# Patient Record
Sex: FEMALE | Race: BLACK OR AFRICAN AMERICAN | Employment: UNEMPLOYED | URBAN - METROPOLITAN AREA
[De-identification: names, ages, dates, MRNs, and addresses within clinical notes are randomized per-mention and may not be internally consistent; named-entity substitution may affect disease eponyms.]

---

## 2019-11-21 LAB
ANTIBODY SCREEN, EXTERNAL: POSITIVE
CHLAMYDIA, EXTERNAL: NEGATIVE
GRBS, EXTERNAL: NEGATIVE
HBSAG, EXTERNAL: NEGATIVE
HCT, EXTERNAL: 37.5
HGB EVAL, EXTERNAL: NEGATIVE
HGB, EXTERNAL: 12.6
HIV, EXTERNAL: NON REACTIVE
N. GONORRHEA, EXTERNAL: NEGATIVE
PLATELET CNT,   EXTERNAL: 194
RPR, EXTERNAL: NON REACTIVE
RUBELLA, EXTERNAL: NORMAL
TYPE, ABO & RH, EXTERNAL: NORMAL
URINALYSIS, EXTERNAL: NO GROWTH

## 2020-01-24 ENCOUNTER — ROUTINE PRENATAL (OUTPATIENT)
Dept: OBGYN CLINIC | Age: 35
End: 2020-01-24

## 2020-01-24 VITALS
HEART RATE: 106 BPM | TEMPERATURE: 97.6 F | HEIGHT: 65 IN | OXYGEN SATURATION: 99 % | RESPIRATION RATE: 20 BRPM | BODY MASS INDEX: 28.86 KG/M2 | WEIGHT: 173.2 LBS | SYSTOLIC BLOOD PRESSURE: 112 MMHG | DIASTOLIC BLOOD PRESSURE: 72 MMHG

## 2020-01-24 DIAGNOSIS — Z67.91 RH NEGATIVE STATE IN ANTEPARTUM PERIOD: ICD-10-CM

## 2020-01-24 DIAGNOSIS — O26.899 RH NEGATIVE STATE IN ANTEPARTUM PERIOD: ICD-10-CM

## 2020-01-24 DIAGNOSIS — O44.40 LOW-LYING PLACENTA: ICD-10-CM

## 2020-01-24 NOTE — PROGRESS NOTES
Jostin Renee presents today for   Chief Complaint   Patient presents with    Routine Prenatal Visit     ILDA       Is someone accompanying this pt? no  atient does not have any concerns at this time. Patient no birth control at this time . Depression screening     Is the patient using any DME equipment during OV?  no    Depression Screening:  No flowsheet data found. Learning Assessment:  No flowsheet data found. Abuse Screening:  No flowsheet data found. Fall Risk  No flowsheet data found. This Visit Test  Results for orders placed or performed during the hospital encounter of 10/14/19   WET PREP   Result Value Ref Range    Wet prep        No Yeast or motile Trichomonas seen  No Clue Cells Seen     CT/GC DNA   Result Value Ref Range    CHLAMYDIA TRACHOMATIS DNA NOT DETECTED NOT DETECTED      NEISSERIA GONORRHOEAE DNA NOT DETECTED NOT DETECTED     BETA HCG, QT   Result Value Ref Range    HCG, beta, QT 43,611 mIU/ml   POC URINE MACROSCOPIC   Result Value Ref Range    Glucose Negative NEGATIVE,Negative mg/dl    Bilirubin Small (A) NEGATIVE,Negative      Ketone 15 (A) NEGATIVE,Negative mg/dl    Specific gravity >=1.030 1.005 - 1.030      Blood Negative NEGATIVE,Negative      pH (UA) 6.0 5 - 9      Protein 30 (A) NEGATIVE,Negative mg/dl    Urobilinogen 2.0 (H) 0.0 - 1.0 EU/dl    Nitrites Negative NEGATIVE,Negative      Leukocyte Esterase Negative NEGATIVE,Negative      Color Yellow      Appearance Clear     POC HCG,URINE   Result Value Ref Range    HCG urine, QL positive (A) NEGATIVE,Negative,negative     BLOOD TYPE, (ABO+RH)   Result Value Ref Range    ABO/Rh(D) B Rh Negative     ANTIBODY SCREEN   Result Value Ref Range    Antibody screen NEG         Health Maintenance reviewed and discussed and ordered per Provider. Health Maintenance Due   Topic Date Due    PAP AKA CERVICAL CYTOLOGY  12/21/2007    Influenza Age 5 to Adult  08/01/2019   . Coordination of Care:  1.  Have you been to the ER, urgent care clinic since your last visit? Hospitalized since your last visit? no    2. Have you seen or consulted any other health care providers outside of the [address] [place] [address] since your last visit? Include any pap smears or colon screening.  No  Visit Vitals  /72   Pulse (!) 106   Temp 97.6 °F (36.4 °C) (Oral)   Resp 20   Ht 5' 5\" (1.651 m)   Wt 173 lb 3.2 oz (78.6 kg)   SpO2 99%   BMI 28.82 kg/m²

## 2020-01-24 NOTE — PROGRESS NOTES
26.4WGA - doing well; transfer of care from [facility]. Only pregnancy complications are RH negative status for which she will need Rhogam at 28 weeks and low lying placenta which was suppose to be looked at again at morphology ultrasound which we still have to get done.   Plan:  1) RH negative: Rhogam at 28 weeks  2) Morphology ultrasound: scheduled for Monday 1/27 @ 9 am  3) F/U 2 weeks routine ob and rhogam, glucola, tdap

## 2020-02-13 ENCOUNTER — HOSPITAL ENCOUNTER (OUTPATIENT)
Dept: LAB | Age: 35
Discharge: HOME | End: 2020-02-13

## 2020-02-13 ENCOUNTER — ROUTINE PRENATAL (OUTPATIENT)
Dept: OBGYN CLINIC | Age: 35
End: 2020-02-13

## 2020-02-13 VITALS
DIASTOLIC BLOOD PRESSURE: 64 MMHG | WEIGHT: 174.8 LBS | OXYGEN SATURATION: 99 % | HEART RATE: 85 BPM | RESPIRATION RATE: 20 BRPM | SYSTOLIC BLOOD PRESSURE: 108 MMHG | BODY MASS INDEX: 29.12 KG/M2 | HEIGHT: 65 IN

## 2020-02-13 DIAGNOSIS — Z67.91 RH NEGATIVE STATE IN ANTEPARTUM PERIOD: Primary | ICD-10-CM

## 2020-02-13 DIAGNOSIS — O44.20 PARTIAL PLACENTA PREVIA: ICD-10-CM

## 2020-02-13 DIAGNOSIS — Z3A.29 29 WEEKS GESTATION OF PREGNANCY: ICD-10-CM

## 2020-02-13 DIAGNOSIS — O26.899 RH NEGATIVE STATE IN ANTEPARTUM PERIOD: Primary | ICD-10-CM

## 2020-02-13 LAB
GTT, 1 HR, GLUCOLA, EXTERNAL: 155
GTT, 1 HR, GLUCOLA, EXTERNAL: 155
SENTARA SPECIMEN COL,SENBCF: NORMAL

## 2020-02-13 PROCEDURE — 99001 SPECIMEN HANDLING PT-LAB: CPT

## 2020-02-13 NOTE — PROGRESS NOTES
29.3: +FM  Plan:  1) Rhogam  2) TDAP  3) Gluolca/CBC  4) partial placenta Previa: needs mfm referral for reevaluation

## 2020-02-13 NOTE — PROGRESS NOTES
After obtaining consent, and per orders of Dr. Precious Willoughby, injection of TDAP given by Shelli Brooks LPN. Patient instructed to remain in clinic for 10 minutes afterwards, and to report any adverse reaction to me immediately.

## 2020-02-13 NOTE — PROGRESS NOTES
Philomena Ramirez presents today for   Chief Complaint   Patient presents with    Routine Prenatal Visit       Is someone accompanying this pt? no  atient does not have any concerns at this time. Patient no birth control at this time . Depression screening     Is the patient using any DME equipment during OV? no    Depression Screening:  No flowsheet data found. Learning Assessment:  No flowsheet data found. Abuse Screening:  No flowsheet data found. Fall Risk  No flowsheet data found. This Visit Test  Results for orders placed or performed during the hospital encounter of 10/14/19   WET PREP   Result Value Ref Range    Wet prep        No Yeast or motile Trichomonas seen  No Clue Cells Seen     CT/GC DNA   Result Value Ref Range    CHLAMYDIA TRACHOMATIS DNA NOT DETECTED NOT DETECTED      NEISSERIA GONORRHOEAE DNA NOT DETECTED NOT DETECTED     BETA HCG, QT   Result Value Ref Range    HCG, beta, QT 43,611 mIU/ml   POC URINE MACROSCOPIC   Result Value Ref Range    Glucose Negative NEGATIVE,Negative mg/dl    Bilirubin Small (A) NEGATIVE,Negative      Ketone 15 (A) NEGATIVE,Negative mg/dl    Specific gravity >=1.030 1.005 - 1.030      Blood Negative NEGATIVE,Negative      pH (UA) 6.0 5 - 9      Protein 30 (A) NEGATIVE,Negative mg/dl    Urobilinogen 2.0 (H) 0.0 - 1.0 EU/dl    Nitrites Negative NEGATIVE,Negative      Leukocyte Esterase Negative NEGATIVE,Negative      Color Yellow      Appearance Clear     POC HCG,URINE   Result Value Ref Range    HCG urine, QL positive (A) NEGATIVE,Negative,negative     BLOOD TYPE, (ABO+RH)   Result Value Ref Range    ABO/Rh(D) B Rh Negative     ANTIBODY SCREEN   Result Value Ref Range    Antibody screen NEG         Health Maintenance reviewed and discussed and ordered per Provider. Health Maintenance Due   Topic Date Due    OB 3RD TRIMESTER TDAP  01/27/2020   . Coordination of Care:  1. Have you been to the ER, urgent care clinic since your last visit?  Hospitalized since your last visit? no    2. Have you seen or consulted any other health care providers outside of the [address] [place] [address] since your last visit? Include any pap smears or colon screening.  No  Visit Vitals  /64   Pulse 85   Resp 20   Ht 5' 5\" (1.651 m)   Wt 174 lb 12.8 oz (79.3 kg)   LMP 07/22/2019 (Exact Date)   SpO2 99%   BMI 29.09 kg/m²

## 2020-02-13 NOTE — PATIENT INSTRUCTIONS

## 2020-02-13 NOTE — PROGRESS NOTES
Per  order Patient received Rhogam injection in Right Gluteus without complications. Patient observed for 10 minutes post injection.   [id number]  6/29/2020

## 2020-02-14 LAB
ABSOLUTE LYMPHOCYTE COUNT, 10803: 1.9 K/UL (ref 1–4.8)
BASOPHILS # BLD: 0 K/UL (ref 0–0.2)
BASOPHILS NFR BLD: 1 % (ref 0–2)
EOSINOPHIL # BLD: 0 K/UL (ref 0–0.5)
EOSINOPHIL NFR BLD: 1 % (ref 0–6)
ERYTHROCYTE [DISTWIDTH] IN BLOOD BY AUTOMATED COUNT: 13.2 % (ref 10–15.5)
GESTATIONAL DIABETES SCREEN, 102285: 155 MG/DL (ref 65–139)
GRANULOCYTES,GRANS: 58 % (ref 40–75)
HCT VFR BLD AUTO: 37.8 % (ref 35.1–46.5)
HGB BLD-MCNC: 12.3 G/DL (ref 11.7–15.5)
IMMATURE PLATELET FRACTION: 10.1 % (ref 1.1–7.1)
LYMPHOCYTES, LYMLT: 33 % (ref 20–45)
MCH RBC QN AUTO: 28 PG (ref 26–34)
MCHC RBC AUTO-ENTMCNC: 33 G/DL (ref 31–36)
MCV RBC AUTO: 86 FL (ref 81–99)
MONOCYTES # BLD: 0.4 K/UL (ref 0.1–1)
MONOCYTES NFR BLD: 8 % (ref 3–12)
NEUTROPHILS # BLD AUTO: 3.3 K/UL (ref 1.8–7.7)
PLATELET # BLD AUTO: 143 K/UL (ref 140–440)
PMV BLD AUTO: 11.6 FL (ref 9–13)
RBC # BLD AUTO: 4.4 M/UL (ref 3.8–5.2)
WBC # BLD AUTO: 5.6 K/UL (ref 4–11)

## 2020-02-21 ENCOUNTER — DOCUMENTATION ONLY (OUTPATIENT)
Dept: OBGYN CLINIC | Age: 35
End: 2020-02-21

## 2020-02-21 NOTE — PROGRESS NOTES
I called patient this morning and left her a voicemail in regards to her needing to come in this upcoming Monday morning at 8:30 am in a fasting state for her three hour glucola.

## 2020-02-21 NOTE — PROGRESS NOTES
Patient made aware of abnormal results and given instructions on 3 day diet for 3hr GTT  patient verbalized understanding and stated she would do diet sat,sun,mon and come to office on Tuesday.

## 2020-02-24 ENCOUNTER — OFFICE VISIT (OUTPATIENT)
Dept: OBGYN CLINIC | Age: 35
End: 2020-02-24

## 2020-02-24 DIAGNOSIS — R73.09 ABNORMAL GLUCOSE: Primary | ICD-10-CM

## 2020-02-24 LAB
GTT 120 MIN, EXTERNAL: 151
GTT 180 MIN, EXTERNAL: 124
GTT 60 MIN, EXTERNAL: 164
GTT, FASTING, EXTERNAL: 90

## 2020-02-25 LAB
GLUCOSE, 2 HR,GTT2: 151 MG/DL
GLUCOSE, 3 HOUR, 011197: 124 MG/DL
GTT 1 HR, 6854: 164 MG/DL
GTT FASTING,90352: 90 MG/DL (ref 70–99)

## 2020-02-28 ENCOUNTER — ROUTINE PRENATAL (OUTPATIENT)
Dept: OBGYN CLINIC | Age: 35
End: 2020-02-28

## 2020-02-28 VITALS
HEIGHT: 65 IN | DIASTOLIC BLOOD PRESSURE: 72 MMHG | HEART RATE: 62 BPM | BODY MASS INDEX: 29.16 KG/M2 | RESPIRATION RATE: 20 BRPM | SYSTOLIC BLOOD PRESSURE: 115 MMHG | WEIGHT: 175 LBS

## 2020-02-28 DIAGNOSIS — Z3A.31 31 WEEKS GESTATION OF PREGNANCY: Primary | ICD-10-CM

## 2020-02-28 NOTE — PROGRESS NOTES
Ms. Narinder Jansen is a   31w4d with Estimated Date of Delivery: 20 who presents to the office for a routine prenatal visit. She denies contractions, leakage of fluid, or vaginal bleeding. She reports normal fetal movement. Visit Vitals  /72   Pulse 62   Resp 20   Ht 5' 5\" (1.651 m)   Wt 175 lb (79.4 kg)   LMP 2019 (Exact Date)   BMI 29.12 kg/m²       A/P  36 y.o. with an IUP at 31w4d here for a routine prenatal visit. 1. Continue routine prenatal care  2. Strict 3rd trimester precautions given. Advised regarding leakage of fluid, contractions, and vaginal bleeding. Fetal kick count instructions given. 3. F/U in 2 weeks  4.  Repeat U/S with [facility] on this upcoming Tuesday to re-evaluate placental location

## 2020-03-06 LAB
BILIRUB UR QL STRIP: NEGATIVE
GLUCOSE UR-MCNC: NEGATIVE MG/DL
KETONES P FAST UR STRIP-MCNC: NEGATIVE MG/DL
PH UR STRIP: 7 [PH] (ref 4.6–8)
PROT UR QL STRIP: NEGATIVE
SP GR UR STRIP: 1.01 (ref 1–1.03)
UA UROBILINOGEN AMB POC: NORMAL (ref 0.2–1)
URINALYSIS CLARITY POC: CLEAR
URINALYSIS COLOR POC: YELLOW
URINE BLOOD POC: NEGATIVE
URINE LEUKOCYTES POC: NORMAL
URINE NITRITES POC: NEGATIVE

## 2020-03-16 ENCOUNTER — ROUTINE PRENATAL (OUTPATIENT)
Dept: OBGYN CLINIC | Age: 35
End: 2020-03-16

## 2020-03-16 VITALS
DIASTOLIC BLOOD PRESSURE: 73 MMHG | TEMPERATURE: 98.5 F | HEIGHT: 65 IN | HEART RATE: 109 BPM | BODY MASS INDEX: 28.82 KG/M2 | WEIGHT: 173 LBS | SYSTOLIC BLOOD PRESSURE: 119 MMHG | RESPIRATION RATE: 18 BRPM

## 2020-03-16 DIAGNOSIS — Z3A.34 PREGNANCY WITH 34 COMPLETED WEEKS GESTATION: Primary | ICD-10-CM

## 2020-03-16 PROBLEM — O41.8X30 SUBCHORIONIC HEMORRHAGE OF PLACENTA IN THIRD TRIMESTER: Status: ACTIVE | Noted: 2020-03-16

## 2020-03-16 PROBLEM — O46.8X3 SUBCHORIONIC HEMORRHAGE OF PLACENTA IN THIRD TRIMESTER: Status: ACTIVE | Noted: 2020-03-16

## 2020-03-16 NOTE — PROGRESS NOTES
34w0d. No Ob issues  Denies LOF/VB  Has received Tdap and Rhogam.  Placenta previa resolved, but a marginal subchorionic hemorrhage noted, which will be re-assessed on 3/30. RTO 2 weeks. GBS and cultures next visit.

## 2020-03-30 ENCOUNTER — ROUTINE PRENATAL (OUTPATIENT)
Dept: OBGYN CLINIC | Age: 35
End: 2020-03-30

## 2020-03-30 VITALS
WEIGHT: 172.8 LBS | HEIGHT: 65 IN | RESPIRATION RATE: 20 BRPM | BODY MASS INDEX: 28.79 KG/M2 | HEART RATE: 110 BPM | TEMPERATURE: 98.7 F | SYSTOLIC BLOOD PRESSURE: 111 MMHG | DIASTOLIC BLOOD PRESSURE: 75 MMHG | OXYGEN SATURATION: 99 %

## 2020-03-30 DIAGNOSIS — Z3A.36 36 WEEKS GESTATION OF PREGNANCY: Primary | ICD-10-CM

## 2020-03-30 NOTE — PROGRESS NOTES
Letty Galvan presents today for   Chief Complaint   Patient presents with    Routine Prenatal Visit       Is someone accompanying this pt? no  Patient does not have any concerns at this time. Patient no birth control at this time . Depression screening     Is the patient using any DME equipment during OV? no    Depression Screening:  No flowsheet data found. Learning Assessment:  Learning Assessment 2/28/2020   PRIMARY LEARNER Patient   PRIMARY LANGUAGE ENGLISH   LEARNER PREFERENCE PRIMARY READING     LISTENING   ANSWERED BY patient    RELATIONSHIP SELF       Abuse Screening:  No flowsheet data found. Fall Risk  No flowsheet data found. This Visit Test  Results for orders placed or performed in visit on 02/28/20   AMB POC URINALYSIS DIP STICK MANUAL W/O MICRO   Result Value Ref Range    Color (UA POC) Yellow     Clarity (UA POC) Clear     Glucose (UA POC) Negative Negative    Bilirubin (UA POC) Negative Negative    Ketones (UA POC) Negative Negative    Specific gravity (UA POC) 1.010 1.001 - 1.035    Blood (UA POC) Negative Negative    pH (UA POC) 7.0 4.6 - 8.0    Protein (UA POC) Negative Negative    Urobilinogen (UA POC) 0.2 mg/dL 0.2 - 1    Nitrites (UA POC) Negative Negative    Leukocyte esterase (UA POC) Trace Negative       Health Maintenance reviewed and discussed and ordered per Provider. There are no preventive care reminders to display for this patient. .      Coordination of Care:  1. Have you been to the ER, urgent care clinic since your last visit? Hospitalized since your last visit? no    2. Have you seen or consulted any other health care providers outside of the [address] [place] [address] since your last visit? Include any pap smears or colon screening.  No  Visit Vitals  /75   Pulse (!) 110   Temp 98.7 °F (37.1 °C) (Oral)   Resp 20   Ht 5' 5\" (1.651 m)   Wt 172 lb 12.8 oz (78.4 kg)   LMP 07/22/2019 (Exact Date)   SpO2 99%   BMI 28.76 kg/m²

## 2020-03-30 NOTE — PATIENT INSTRUCTIONS
CC:  Jolene L Schoenfeldt is here today for f/u bronchitis - was seen in [facility] on 11/24/17.      Medications: medications verified, updated  Refills needed today?  NO  Complains of Latex allergy or sensitivity  Tobacco history: verified  There are no preventive care reminders to display for this patient.  Patient is up to date, no discussion needed.   Weeks 34 to 36 of Your Pregnancy: Care Instructions  Your Care Instructions    By now, your baby and your belly have grown quite large. It is almost time to give birth. Your baby's lungs are almost ready to breathe air. The bones in your baby's head are now firm enough to protect it, but soft enough to move down through the birth canal.  You may feel excited, happy, anxious, or scared. You may wonder how you will know if you are in labor or what to expect during labor. Try to be flexible in your expectations of the birth. Because each birth is different, there is no way to know exactly what childbirth will be like for you. This care sheet will help you know what to expect and how to prepare. This may make your childbirth easier. If you haven't already had the Tdap shot during this pregnancy, talk to your doctor about getting it. It will help protect your  against pertussis infection. In the 36th week, most women have a test for group B streptococcus (GBS). GBS is a common bacteria that can live in the vagina and rectum. It can make your baby sick after birth. If you test positive, you will get antibiotics during labor. The medicine will keep your baby from getting the bacteria. Follow-up care is a key part of your treatment and safety. Be sure to make and go to all appointments, and call your doctor if you are having problems. It's also a good idea to know your test results and keep a list of the medicines you take. How can you care for yourself at home? Learn about pain relief choices  · Pain is different for every woman. Talk with your doctor about your feelings about pain. · You can choose from several types of pain relief. These include medicine or breathing techniques, as well as comfort measures. You can use more than one option. · If you choose to have pain medicine during labor, talk to your doctor about your options. Some medicines lower anxiety and help with some of the pain.  Others make your lower body numb so that you won't feel pain. · Be sure to tell your doctor about your pain medicine choice before you start labor or very early in your labor. You may be able to change your mind as labor progresses. · Rarely, a woman is put to sleep by medicine given through a mask or an IV. Labor and delivery  · The first stage of labor has three parts: early, active, and transition. ? Most women have early labor at home. You can stay busy or rest, eat light snacks, drink clear fluids, and start counting contractions. ? When talking during a contraction gets hard, you may be moving to active labor. During active labor, you should head for the hospital if you are not there already. ? You are in active labor when contractions come every 3 to 4 minutes and last about 60 seconds. Your cervix is opening more rapidly. ? If your water breaks, contractions will come faster and stronger. ? During transition, your cervix is stretching, and contractions are coming more rapidly. ? You may want to push, but your cervix might not be ready. Your doctor will tell you when to push. · The second stage starts when your cervix is completely opened and you are ready to push. ? Contractions are very strong to push the baby down the birth canal.  ? You will feel the urge to push. You may feel like you need to have a bowel movement. ? You may be coached to push with contractions. These contractions will be very strong, but you will not have them as often. You can get a little rest between contractions. ? You may be emotional and irritable. You may not be aware of what is going on around you.  ? One last push, and your baby is born. · The third stage is when a few more contractions push out the placenta. This may take 30 minutes or less. · The fourth stage is the welcome recovery. You may feel overwhelmed with emotions and exhausted but alert. This is a good time to start breastfeeding. Where can you learn more?   Go to http://adelina-ana.info/  Enter O891 in the search box to learn more about \"Weeks 34 to 36 of Your Pregnancy: Care Instructions. \"  Current as of: May 29, 2019Content Version: 12.4  © 8621-5995 Healthwise, Incorporated. Care instructions adapted under license by [employer] (which disclaims liability or warranty for this information). If you have questions about a medical condition or this instruction, always ask your healthcare professional. [address] any warranty or liability for your use of this information.

## 2020-03-30 NOTE — PROGRESS NOTES
36 WGA. Doing well, +FM; marginal subchorionic hemorrhage detected on [facility] ultrasound-patient still cleared for vaginal delivery- they also stated that she needs a follow-up  scan with them in 4 weeks for reevaluation however have cancelled the ultrasound due to COVID-19.   Plan:  1) gbs/gonorrhea/chlamydia collected today  2) labor precautions reviewed  3) f/u 1 week routine ob

## 2020-03-31 DIAGNOSIS — Z3A.36 36 WEEKS GESTATION OF PREGNANCY: ICD-10-CM

## 2020-03-31 LAB
CHLAMYDIA TRACHOMATIS, NAA, 180097: NEGATIVE
CHLAMYDIA, EXTERNAL: NEGATIVE
GRBS, EXTERNAL: NEGATIVE
N. GONORRHEA, EXTERNAL: NEGATIVE
NEISSERIA GONORRHOEAE, NAA, 180104: NEGATIVE
RESULT: NORMAL
TRICH VAG BY NAA, 180087: NEGATIVE

## 2020-04-06 ENCOUNTER — ROUTINE PRENATAL (OUTPATIENT)
Dept: OBGYN CLINIC | Age: 35
End: 2020-04-06

## 2020-04-06 VITALS
RESPIRATION RATE: 18 BRPM | SYSTOLIC BLOOD PRESSURE: 106 MMHG | BODY MASS INDEX: 28.99 KG/M2 | WEIGHT: 174 LBS | DIASTOLIC BLOOD PRESSURE: 71 MMHG | HEART RATE: 11 BPM | HEIGHT: 65 IN

## 2020-04-06 DIAGNOSIS — Z3A.37 37 WEEKS GESTATION OF PREGNANCY: Primary | ICD-10-CM

## 2020-04-06 LAB
BILIRUB UR QL STRIP: NORMAL
GLUCOSE UR-MCNC: NORMAL MG/DL
KETONES P FAST UR STRIP-MCNC: NORMAL MG/DL
PH UR STRIP: NORMAL [PH] (ref 4.6–8)
PROT UR QL STRIP: NORMAL
SP GR UR STRIP: NORMAL (ref 1–1.03)
UA UROBILINOGEN AMB POC: NORMAL (ref 0.2–1)
URINALYSIS CLARITY POC: NORMAL
URINALYSIS COLOR POC: NORMAL
URINE BLOOD POC: NORMAL
URINE LEUKOCYTES POC: NORMAL
URINE NITRITES POC: NORMAL

## 2020-04-06 NOTE — PROGRESS NOTES
Ms. Rosa Ley is a   37w0d with Estimated Date of Delivery: 20 who presents to the office for a routine prenatal visit. She denies contractions, leakage of fluid, or vaginal bleeding. She reports normal fetal movement. Visit Vitals  /71   Pulse (!) 11   Resp 18   Ht 5' 5\" (1.651 m)   Wt 174 lb (78.9 kg)   LMP 2019 (Exact Date)   BMI 28.96 kg/m²       A/P  36 y.o. with an IUP at 37w0d here for a routine prenatal visit. 1. Continue routine prenatal care  2. Strict 3rd trimester precautions given. Advised regarding leakage of fluid, contractions, and vaginal bleeding. Fetal kick count instructions given. 3. F/U in 2 weeks  4. GBS-  5. Advised to drive by [facility] so that she is familiar with the campus/parking to which she expressed understanding.

## 2020-04-06 NOTE — PROGRESS NOTES
Chief Complaint   Patient presents with    Routine Prenatal Visit     Rm 6     Results for orders placed or performed in visit on 03/31/20   CULTURE, GENITAL GROUP B STREP   Result Value Ref Range    RESULT Normal    NUSWAB STD PANEL WITHOUT HSV   Result Value Ref Range    T. vaginalis, NILDA Negative Negative    C. trachomatis, NILDA Negative Negative    N. gonorrhoeae, NILDA Negative Negative     Visit Vitals  /71   Pulse (!) 11   Resp 18   Ht 5' 5\" (1.651 m)   Wt 174 lb (78.9 kg)   LMP 07/22/2019 (Exact Date)   BMI 28.96 kg/m²

## 2020-04-20 ENCOUNTER — ROUTINE PRENATAL (OUTPATIENT)
Dept: OBGYN CLINIC | Age: 35
End: 2020-04-20

## 2020-04-20 VITALS
HEIGHT: 65 IN | OXYGEN SATURATION: 99 % | SYSTOLIC BLOOD PRESSURE: 111 MMHG | RESPIRATION RATE: 20 BRPM | BODY MASS INDEX: 29.76 KG/M2 | TEMPERATURE: 98 F | WEIGHT: 178.6 LBS | HEART RATE: 101 BPM | DIASTOLIC BLOOD PRESSURE: 73 MMHG

## 2020-04-20 DIAGNOSIS — Z3A.39 39 WEEKS GESTATION OF PREGNANCY: Primary | ICD-10-CM

## 2020-04-20 NOTE — PATIENT INSTRUCTIONS
Week 39 of Your Pregnancy: Care Instructions  Your Care Instructions    During these final weeks, you may feel anxious to see your new baby. Mark babies often look different from what you see in pictures or movies. Right after birth, their heads may have a strange shape. Their eyes may be puffy. And their genitals may be swollen. They may also have very dry skin, or red marks on the eyelids, nose, or neck. Still, most parents think their babies are beautiful. Follow-up care is a key part of your treatment and safety. Be sure to make and go to all appointments, and call your doctor if you are having problems. It's also a good idea to know your test results and keep a list of the medicines you take. How can you care for yourself at home? Prepare to breastfeed  · If you are breastfeeding, continue to eat healthy foods. · If your health care provider recommends it, keep taking your prenatal vitamins. · Talk to your doctor before you take any medicine or supplement. That's because some medicines can affect your breast milk. · You can help prevent sore nipples if you feed your baby in the correct position. Nurses will help you learn to do this. · Your  will need to be fed about every 1 to 3 hours. Choose the right birth control after your baby is born  · Women who are breastfeeding can still get pregnant. Use birth control if you don't want to get pregnant. · Intrauterine devices (IUDs) are very effective at preventing pregnancy and can provide birth control for 3 to 10 years, depending on the type. If you talk with your doctor before having your baby, the IUD can be placed right after you give birth. If you decide you want to get pregnant later, you can have it removed. They are safe to use while you are breastfeeding. · A hormonal implant is also very effective at preventing pregnancy. It is placed under the skin of the arm. This can be done right after you give birth.  It releases the hormone progestin and prevents pregnancy for about 3 years. This can also be removed by a doctor at any time. It is safe to use while you are breastfeeding. · Depo-Provera can be used while you are breastfeeding. It is a shot you get every 3 months. · Birth control pills work well. But you need a different kind of pill for the first few weeks after giving birth. And when you start taking these pills, you need to make sure to use another type of birth control for 7 days after you start your pack. · Diaphragms, cervical caps, and condoms with spermicide work less well after birth. If you have a diaphragm or cervical cap, talk to your doctor to see if you need a different size. · Tubal ligation (tying your tubes) and vasectomy are both permanent. These are good options if you are sure you are done having children. Where can you learn more? Go to http://adelina-ana.info/  Enter A811 in the search box to learn more about \"Week 39 of Your Pregnancy: Care Instructions. \"  Current as of: May 29, 2019Content Version: 12.4  © 4431-9912 Healthwise, Incorporated. Care instructions adapted under license by [employer] (which disclaims liability or warranty for this information). If you have questions about a medical condition or this instruction, always ask your healthcare professional. [address] any warranty or liability for your use of this information.

## 2020-04-20 NOTE — PROGRESS NOTES
Amanda Rojas presents today for   Chief Complaint   Patient presents with    Routine Prenatal Visit       Is someone accompanying this pt? no  Patient does not have any concerns at this time. Patient no birth control at this time . Depression screening     Is the patient using any DME equipment during OV? no    Depression Screening:  No flowsheet data found. Learning Assessment:  Learning Assessment 2/28/2020   PRIMARY LEARNER Patient   PRIMARY LANGUAGE ENGLISH   LEARNER PREFERENCE PRIMARY READING     LISTENING   ANSWERED BY patient    RELATIONSHIP SELF       Abuse Screening:  No flowsheet data found. Fall Risk  No flowsheet data found. This Visit Test  Results for orders placed or performed in visit on 04/06/20   AMB POC URINALYSIS DIP STICK AUTO W/O MICRO   Result Value Ref Range    Color (UA POC)      Clarity (UA POC)      Glucose (UA POC)      Bilirubin (UA POC)      Ketones (UA POC)      Specific gravity (UA POC)      Blood (UA POC)      pH (UA POC)      Protein (UA POC)      Urobilinogen (UA POC)      Nitrites (UA POC)      Leukocyte esterase (UA POC)         Health Maintenance reviewed and discussed and ordered per Provider. There are no preventive care reminders to display for this patient. .      Coordination of Care:  1. Have you been to the ER, urgent care clinic since your last visit? Hospitalized since your last visit? no    2. Have you seen or consulted any other health care providers outside of the [address] [place] [address] since your last visit? Include any pap smears or colon screening.  No    Visit Vitals  /73   Pulse (!) 101   Temp 98 °F (36.7 °C) (Oral)   Resp 20   Ht 5' 5\" (1.651 m)   Wt 178 lb 9.6 oz (81 kg)   LMP 07/22/2019 (Exact Date)   SpO2 99%   BMI 29.72 kg/m²

## 2020-04-27 ENCOUNTER — ROUTINE PRENATAL (OUTPATIENT)
Dept: OBGYN CLINIC | Age: 35
End: 2020-04-27

## 2020-04-27 VITALS
OXYGEN SATURATION: 99 % | SYSTOLIC BLOOD PRESSURE: 110 MMHG | BODY MASS INDEX: 29.49 KG/M2 | WEIGHT: 177 LBS | RESPIRATION RATE: 20 BRPM | HEART RATE: 100 BPM | HEIGHT: 65 IN | DIASTOLIC BLOOD PRESSURE: 75 MMHG | TEMPERATURE: 97.7 F

## 2020-04-27 DIAGNOSIS — Z3A.40 40 WEEKS GESTATION OF PREGNANCY: Primary | ICD-10-CM

## 2020-04-27 NOTE — PROGRESS NOTES
Oscar Denton presents today for   Chief Complaint   Patient presents with    Routine Prenatal Visit       Is someone accompanying this pt? no  Patient does not have any concerns at this time. Patient no birth control at this time . Depression screening     Is the patient using any DME equipment during OV? no    Depression Screening:  No flowsheet data found. Learning Assessment:  Learning Assessment 2/28/2020   PRIMARY LEARNER Patient   PRIMARY LANGUAGE ENGLISH   LEARNER PREFERENCE PRIMARY READING     LISTENING   ANSWERED BY patient    RELATIONSHIP SELF       Abuse Screening:  No flowsheet data found. Fall Risk  No flowsheet data found. This Visit Test  Results for orders placed or performed in visit on 04/06/20   AMB POC URINALYSIS DIP STICK AUTO W/O MICRO   Result Value Ref Range    Color (UA POC)      Clarity (UA POC)      Glucose (UA POC)      Bilirubin (UA POC)      Ketones (UA POC)      Specific gravity (UA POC)      Blood (UA POC)      pH (UA POC)      Protein (UA POC)      Urobilinogen (UA POC)      Nitrites (UA POC)      Leukocyte esterase (UA POC)         Health Maintenance reviewed and discussed and ordered per Provider. There are no preventive care reminders to display for this patient. .      Coordination of Care:  1. Have you been to the ER, urgent care clinic since your last visit? Hospitalized since your last visit? no    2. Have you seen or consulted any other health care providers outside of the [address] [place] [address] since your last visit? Include any pap smears or colon screening.  No  Visit Vitals  /75   Pulse 100   Temp 97.7 °F (36.5 °C) (Oral)   Resp 20   Ht 5' 5\" (1.651 m)   Wt 177 lb (80.3 kg)   LMP 07/22/2019 (Exact Date)   SpO2 99%   BMI 29.45 kg/m²

## 2020-04-27 NOTE — PROGRESS NOTES
Doing well, no concerns, +FM  Plan: IOL papers sent over to [facility] for 5/4/20

## 2020-05-01 ENCOUNTER — DOCUMENTATION ONLY (OUTPATIENT)
Dept: OBGYN CLINIC | Age: 35
End: 2020-05-01

## 2020-05-01 NOTE — PROGRESS NOTES
This patient is a  and she is scheduled for an induction of labor for post dates on  at 4 am. She will be 41 weeks at that time.

## 2020-05-19 ENCOUNTER — ROUTINE PRENATAL (OUTPATIENT)
Dept: OBGYN CLINIC | Age: 35
End: 2020-05-19

## 2020-06-16 ENCOUNTER — ROUTINE PRENATAL (OUTPATIENT)
Dept: OBGYN CLINIC | Age: 35
End: 2020-06-16

## 2020-06-16 VITALS
TEMPERATURE: 98 F | OXYGEN SATURATION: 99 % | WEIGHT: 155.4 LBS | DIASTOLIC BLOOD PRESSURE: 70 MMHG | HEART RATE: 73 BPM | BODY MASS INDEX: 25.89 KG/M2 | RESPIRATION RATE: 20 BRPM | HEIGHT: 65 IN | SYSTOLIC BLOOD PRESSURE: 116 MMHG

## 2020-06-16 NOTE — PROGRESS NOTES
1. Have you been to the ER, urgent care clinic since your last visit? Hospitalized since your last visit? No    2. Have you seen or consulted any other health care providers outside of the [address] [place] [address] since your last visit? Include any pap smears or colon screening.  No   Visit Vitals  /70   Pulse 73   Temp 98 °F (36.7 °C) (Oral)   Resp 20   Ht 5' 5\" (1.651 m)   Wt 155 lb 6.4 oz (70.5 kg)   LMP 07/22/2019 (Exact Date)   SpO2 99%   BMI 25.86 kg/m²

## 2020-06-16 NOTE — PROGRESS NOTES
Depression Scale  In the past 7 days:  I have been able to laugh and see the funny side of things[de-identified] As much as I always could  I have looked forward with enjoyment to things: As much as I ever did  I have blamed myself unnecessarily when things went wrong: Not very often  I have been anxious or worried for no good reason: No, not at all  I have felt scared or panicky for no good reason: No, not at all  Things have been getting on top of me: No, most of the time I have coped quite well  I have been so unhappy that I have had difficulty sleeping: No, not at all  I have felt sad or miserable: No, not at all  I have been so unhappy that I have been crying: No, never  The thought of harming myself has occured to me: Never  [facility]  Depression Scale (EPDS)  [place]  Depression Score: 2

## 2020-06-16 NOTE — PROGRESS NOTES
Doing well postpartum, no concerns, no feelings of depression, does not desire birth control. F/U as needed.